# Patient Record
Sex: MALE | Race: WHITE | NOT HISPANIC OR LATINO | URBAN - METROPOLITAN AREA
[De-identification: names, ages, dates, MRNs, and addresses within clinical notes are randomized per-mention and may not be internally consistent; named-entity substitution may affect disease eponyms.]

---

## 2019-10-31 ENCOUNTER — OFFICE VISIT - RIVER FALLS (OUTPATIENT)
Dept: FAMILY MEDICINE | Facility: CLINIC | Age: 24
End: 2019-10-31

## 2019-10-31 ASSESSMENT — MIFFLIN-ST. JEOR: SCORE: 1968.29

## 2020-02-21 ENCOUNTER — OFFICE VISIT - RIVER FALLS (OUTPATIENT)
Dept: FAMILY MEDICINE | Facility: CLINIC | Age: 25
End: 2020-02-21

## 2020-02-21 ASSESSMENT — MIFFLIN-ST. JEOR: SCORE: 1968.29

## 2020-02-23 LAB
CHLAMYDIA TRACHOMATIS RNA, TMA - QUEST: NOT DETECTED
HIV AG/AB  - NOTE: NORMAL
NEISSERIA GONORRHOEAE RNA TMA: NOT DETECTED
RPR: NORMAL

## 2020-02-25 ENCOUNTER — COMMUNICATION - RIVER FALLS (OUTPATIENT)
Dept: FAMILY MEDICINE | Facility: CLINIC | Age: 25
End: 2020-02-25

## 2020-09-18 ENCOUNTER — COMMUNICATION - RIVER FALLS (OUTPATIENT)
Dept: FAMILY MEDICINE | Facility: CLINIC | Age: 25
End: 2020-09-18

## 2020-09-18 ENCOUNTER — OFFICE VISIT - RIVER FALLS (OUTPATIENT)
Dept: FAMILY MEDICINE | Facility: CLINIC | Age: 25
End: 2020-09-18

## 2020-09-18 ENCOUNTER — AMBULATORY - RIVER FALLS (OUTPATIENT)
Dept: FAMILY MEDICINE | Facility: CLINIC | Age: 25
End: 2020-09-18

## 2020-09-21 LAB — SARS-COV-2 RNA SPEC QL NAA+PROBE: NOT DETECTED

## 2020-10-15 ENCOUNTER — OFFICE VISIT - RIVER FALLS (OUTPATIENT)
Dept: FAMILY MEDICINE | Facility: CLINIC | Age: 25
End: 2020-10-15

## 2020-10-20 ENCOUNTER — OFFICE VISIT - RIVER FALLS (OUTPATIENT)
Dept: FAMILY MEDICINE | Facility: CLINIC | Age: 25
End: 2020-10-20

## 2020-10-20 ASSESSMENT — MIFFLIN-ST. JEOR: SCORE: 1990.86

## 2022-02-11 VITALS
SYSTOLIC BLOOD PRESSURE: 116 MMHG | TEMPERATURE: 98 F | WEIGHT: 201.4 LBS | BODY MASS INDEX: 25.85 KG/M2 | OXYGEN SATURATION: 98 % | DIASTOLIC BLOOD PRESSURE: 60 MMHG | HEIGHT: 74 IN | HEART RATE: 66 BPM

## 2022-02-11 VITALS
SYSTOLIC BLOOD PRESSURE: 135 MMHG | TEMPERATURE: 97.1 F | WEIGHT: 201.4 LBS | HEART RATE: 62 BPM | BODY MASS INDEX: 25.85 KG/M2 | DIASTOLIC BLOOD PRESSURE: 77 MMHG | HEIGHT: 74 IN

## 2022-02-11 VITALS
HEART RATE: 65 BPM | WEIGHT: 209 LBS | DIASTOLIC BLOOD PRESSURE: 68 MMHG | HEIGHT: 73 IN | TEMPERATURE: 97.6 F | SYSTOLIC BLOOD PRESSURE: 116 MMHG | BODY MASS INDEX: 27.7 KG/M2 | OXYGEN SATURATION: 98 %

## 2022-02-16 NOTE — PROGRESS NOTES
Patient:   NATHANIEL DANIELSON            MRN: [id number]            FIN: [id number]               Age:   25 years     Sex:  Male     :  1995   Associated Diagnoses:   Pre-employment examination   Author:   Karma Torres      Visit Information      Date of Service: 10/20/2020 11:40 am  Performing Location: [facility]  Encounter#: [id number]      Primary Care Provider (PCP):  Isra Frederick PA-C    NPI# [id number]      Referring Provider:  Kamra Torres    NPI# [id number]      Chief Complaint   10/20/2020 11:57 AM CDT  pre-employment [employer]        History of Present Illness   needss pe and back screen and 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   Family History:    No family history items have been selected or recorded.   Procedure history:    None (SNOMED CT 613682883).   Social History:        Alcohol Assessment            Current, 1-2 times per week                     Comments:                      10/31/2019 - Ryan Acevedo CMA                     2-3 drinks per time      Tobacco Assessment            5-9 cigarettes (between 1/4 to 1/2 pack)/day in last 30 days, Electronic Cigarettes, 1 year(s).                     Comments:                      10/31/2019 Ryan Muniz CMA                     2-5 per day      Substance Abuse Assessment            Current, Marijuana, 1-2 times per month      Employment and Education Assessment            Student, Work/School description: Student at [employer].      Home and Environment Assessment             Marital status: Single.  0 children.  Living situation: Home/Independent.  Injuries/Abuse/Neglect in               household: No.  Feels unsafe at home: No.  Family/Friends available for support: Yes.      Nutrition and Health Assessment            Type of diet: Regular.  Wants to lose weight: No.  Sleeping concerns: Yes.  Feels highly stressed: Yes.      Exercise and Physical Activity Assessment            Exercise frequency: 3-4 times/week.  Exercise type: Running, Weight lifting.      Sexual Assessment            Sexually active: Yes.  Identifies as male, Sexual orientation: Straight or heterosexual.  Uses condoms: Yes.               History of sexual abuse: No.        Physical Examination   VS/Measurements   General:  Alert and oriented, No acute distress.    Eye:  Pupils are equal, round and reactive to light, Normal conjunctiva.    HENT:  Tympanic membranes are clear, Normal hearing.    Neck:  Supple, Non-tender, No lymphadenopathy.    Respiratory:  Lungs are clear to auscultation, Respirations are non-labored, Breath sounds are equal.    Cardiovascular:  Normal rate, Regular rhythm, No murmur.    Musculoskeletal:  Normal range of motion, Normal strength, No swelling, No deformity, Normal gait, back screen complete  .       Impression and Plan   Diagnosis     Pre-employment examination (CBI90-JM Z02.1).     Patient Instructions:       Counseled: Patient, Regarding diagnosis, Regarding treatment, Regarding medications, Verbalized understanding.

## 2022-02-16 NOTE — NURSING NOTE
Depression Screening Entered On:  10/31/2019 1:52 PM CDT    Performed On:  10/31/2019 1:52 PM CDT by Ryan Acevedo CMA               Depression Screening   Little Interest - Pleasure in Activities :   More than half the days   Feeling Down, Depressed, Hopeless :   More than half the days   Initial Depression Screen Score :   4    Trouble Falling or Staying Asleep :   Nearly every day   Feeling Tired or Little Energy :   More than half the days   Poor Appetite or Overeating :   More than half the days   Feeling Bad About Yourself :   Nearly every day   Trouble Concentrating :   Nearly every day   Moving or Speaking Slowly :   More than half the days   Thoughts Better Off Dead or Hurting Self :   Several days   Detailed Depression Screen Score :   16    Total Depression Screen Score :   20    JULIA Difficulty with Work, Home, Others :   Somewhat difficult   Ryan Acevedo CMA - 10/31/2019 1:52 PM CDT

## 2022-02-16 NOTE — NURSING NOTE
Comprehensive Intake Entered On:  10/20/2020 12:00 PM CDT    Performed On:  10/20/2020 11:57 AM CDT by Diya Prado LPN               Summary   Chief Complaint :   pre-employment [employer] [facility] [employer]   Advance Directive :   No   Weight Measured :   209 lb(Converted to: 209 lb 0 oz, 94.801 kg)    Height Measured :   73.25 in(Converted to: 6 ft 1 in, 186.05 cm)    Body Mass Index :   27.38 kg/m2 (HI)    Body Surface Area :   2.21 m2   Systolic Blood Pressure :   116 mmHg   Diastolic Blood Pressure :   68 mmHg   Mean Arterial Pressure :   84 mmHg   Peripheral Pulse Rate :   65 bpm   BP Site :   Right arm   BP Method :   Manual   Temperature Tympanic :   97.6 DegF(Converted to: 36.4 DegC)  (LOW)    Oxygen Saturation :   98 %   Diya Prado LPN - 10/20/2020 11:57 AM CDT   Health Status   Allergies Verified? :   Yes   Medication History Verified? :   Yes   Medical History Verified? :   No   Pre-Visit Planning Status :   Completed   Tobacco Use? :   Former smoker   Diya Prado LPN - 10/20/2020 11:57 AM CDT   Meds / Allergies   (As Of: 10/20/2020 12:00:34 PM CDT)   Allergies (Active)   No Known Medication Allergies  Estimated Onset Date:   Unspecified ; Created By:   Ladi Dias CMA; Reaction Status:   Active ; Category:   Drug ; Substance:   No Known Medication Allergies ; Type:   Allergy ; Updated By:   Ladi Dias CMA; Reviewed Date:   9/18/2020 12:26 PM CDT        Medication List   (As Of: 10/20/2020 12:00:34 PM CDT)        ID Risk Screen   Recent Travel History :   No recent travel   Family Member Travel History :   No recent travel   Other Exposure to Infectious Disease :   Unknown   Diya Prado LPN - 10/20/2020 11:57 AM CDT

## 2022-02-16 NOTE — NURSING NOTE
Comprehensive Intake Entered On:  9/18/2020 12:28 PM CDT    Performed On:  9/18/2020 12:25 PM CDT by Sanam Soni               Summary   Chief Complaint :   Consent for video visit. Pt c/o sore throat and runny/stuffy nose.    Advance Directive :   No   Sanam Soni - 9/18/2020 12:25 PM CDT   Health Status   Allergies Verified? :   Yes   Medication History Verified? :   Yes   Medical History Verified? :   Yes   Pre-Visit Planning Status :   Completed   Tobacco Use? :   Never smoker   Sanam Soni - 9/18/2020 12:25 PM CDT   Consents   Consent for Immunization Exchange :   Consent Granted   Consent for Immunizations to Providers :   Consent Granted   Sanam Soni - 9/18/2020 12:25 PM CDT   Meds / Allergies   (As Of: 9/18/2020 12:28:03 PM CDT)   Allergies (Active)   No Known Medication Allergies  Estimated Onset Date:   Unspecified ; Created By:   Ladi Dias CMA; Reaction Status:   Active ; Category:   Drug ; Substance:   No Known Medication Allergies ; Type:   Allergy ; Updated By:   Ladi Dias CMA; Reviewed Date:   9/18/2020 12:26 PM CDT        Medication List   (As Of: 9/18/2020 12:28:03 PM CDT)   No Known Home Medications     Sanam Soni - 9/18/2020 12:26:22 PM           ID Risk Screen   Recent Travel History :   No recent travel   Family Member Travel History :   No recent travel   Other Exposure to Infectious Disease :   Unknown   Sanam Soni - 9/18/2020 12:25 PM CDT

## 2022-02-16 NOTE — TELEPHONE ENCOUNTER
Patient:   NATHANIEL DANIELSON            MRN: [id number]            FIN: [id number]               Age:   25 years     Sex:  Male     :  1995   Associated Diagnoses:   None   Author:   Luz MUSE, Libby JORDAN      Pt notified of negative RST

## 2022-02-16 NOTE — NURSING NOTE
CAGE Assessment Entered On:  10/31/2019 1:52 PM CDT    Performed On:  10/31/2019 1:52 PM CDT by Ryan Acevedo CMA               Assessment   Have you ever felt you should cut down on your drinking :   Yes   Have people annoyed you by criticizing your drinking :   No   Have you ever felt bad or guilty about your drinking :   No   Have you ever taken a drink first thing in the morning to steady your nerves or get rid of a hangover (Eye-opener) :   No   CAGE Score :   1    Ryan Acevedo CMA - 10/31/2019 1:52 PM CDT

## 2022-02-16 NOTE — TELEPHONE ENCOUNTER
---------------------  From: oRsa M Paulino CMA   Sent: 9/18/2020 3:33:02 PM CDT  Subject: Curbside Testing     Pt was seen for covid curbside testing today per Libby Escobar. 02 Sat 97%. Specimen sent through [employer]. No priority. Faxed forms to [facility].

## 2022-02-16 NOTE — PROGRESS NOTES
Patient:   NATHANIEL DANIELSON            MRN: [id number]            FIN: [id number]               Age:   25 years     Sex:  Male     :  1995   Associated Diagnoses:   Screen for STD (sexually transmitted disease)   Author:   Miki Henry MD      Visit Information      Date of Service: 2020 08:14 am  Performing Location: [facility]  Encounter#: [id number]      Primary Care Provider (PCP):  NONE ,       Referring Provider:  Miki Henry MD    NPI# [id number]      Chief Complaint   2020 8:17 AM CST    STI screening.        History of Present Illness   chief complaint and symptoms as noted above confirmed with patient   no sxs  1 partner last year  no condoms      Review of Systems   Constitutional:  Negative except as documented in history of present illness.    Genitourinary:  Negative.    Integumentary:  Negative.    Neurologic:  Negative.       Health Status   Allergies:    Allergic Reactions (Selected)  No Known Medication Allergies   Medications:    Medications          No medications documented     Problem list:    All Problems  Tobacco user / SNOMED CT 769856828 / Probable      Histories   Past Medical History:    No active or resolved past medical history items have been selected or recorded.   Family History:    No family history items have been selected or recorded.   Procedure history:    None (SNOMED CT 060070988).   Social History:        Alcohol Assessment            Current, 1-2 times per week                     Comments:                      10/31/2019 - Ryan Acevedo CMA                     2-3 drinks per time      Tobacco Assessment            5-9 cigarettes (between 1/4 to 1/2 pack)/day in last 30 days, Electronic Cigarettes, 1 year(s).                     Comments:                      10/31/2019 Ryan Muniz CMA                     2-5 per day      Substance Abuse Assessment            Current, Marijuana, 1-2 times per month      Employment and  Education Assessment            Student, Work/School description: Student at [employer].      Home and Environment Assessment            Marital status: Single.  0 children.  Living situation: Home/Independent.  Injuries/Abuse/Neglect in               household: No.  Feels unsafe at home: No.  Family/Friends available for support: Yes.      Nutrition and Health Assessment            Type of diet: Regular.  Wants to lose weight: No.  Sleeping concerns: Yes.  Feels highly stressed: Yes.      Exercise and Physical Activity Assessment            Exercise frequency: 3-4 times/week.  Exercise type: Running, Weight lifting.      Sexual Assessment            Sexually active: Yes.  Identifies as male, Sexual orientation: Straight or heterosexual.  Uses condoms: Yes.               History of sexual abuse: No.        Physical Examination   Vital Signs   2/21/2020 8:17 AM CST Temperature Tympanic 97.1 DegF  LOW    Peripheral Pulse Rate 62 bpm    HR Method Electronic    Systolic Blood Pressure 135 mmHg  HI    Diastolic Blood Pressure 77 mmHg    Mean Arterial Pressure 96 mmHg    BP Method Electronic      Measurements from flowsheet : Measurements   2/21/2020 8:17 AM CST Height Measured - Standard 74 in    Weight Measured - Standard 201.4 lb    BSA 2.18 m2    Body Mass Index 25.86 kg/m2  HI      General:  Alert and oriented.    Genitourinary:  No scrotal tenderness, No inguinal tenderness, No urethral discharge, No lesions.       Impression and Plan   Diagnosis     Screen for STD (sexually transmitted disease) (USV46-UI Z11.3).     Course:  Progressing as expected.    Plan:  sti screen.    Patient Instructions:       Counseled: Patient, Regarding diagnosis, Regarding treatment, Activity.

## 2022-02-16 NOTE — PROGRESS NOTES
Patient:   NATHANIEL DANIELSON            MRN: [id number]            FIN: [id number]               Age:   24 years     Sex:  Male     :  1995   Associated Diagnoses:   Acute maxillary sinusitis; Conjunctivitis   Author:   Isra Frederick PA-C      Chief Complaint   10/31/2019 11:13 AM CDT  c/o left eye being crusted shut this AM, itchy and feels swollen, sore throat, cough, and nasal congestion for the past 2 weeks        History of Present Illness   Chief complaint and symptoms noted above and confirmed with patient   mattering left eye for 2 days, vision is not affected, does not wear contacts  also has a 2 week hx of sore throat, cough, congestion; taking dayquil for this      Review of Systems   Constitutional:  Negative.    Eye:       Discharge: Left.    Ear/Nose/Mouth/Throat:  Nasal congestion, Sore throat.    Respiratory:  Cough, Sputum production.    Cardiovascular:  Negative.    Gastrointestinal:  Negative.       Health Status   Allergies:    Allergic Reactions (All)  No Known Medication Allergies   Medications: not on any regular medications      Histories   Family History:    No family history items have been selected or recorded.   Procedure history:    None ([id number]).      Physical Examination   Vital Signs   10/31/2019 11:13 AM CDT Temperature Tympanic 98 DegF    Peripheral Pulse Rate 66 bpm    Pulse Site Radial artery    HR Method Manual    Systolic Blood Pressure 116 mmHg    Diastolic Blood Pressure 60 mmHg    Mean Arterial Pressure 79 mmHg    BP Site Right arm    BP Method Manual    Oxygen Saturation 98 %      Measurements from flowsheet : Measurements   10/31/2019 11:13 AM CDT Height Measured - Standard 74 in    Weight Measured - Standard 201.4 lb    BSA 2.18 m2    Body Mass Index 25.86 kg/m2  HI      General:  No acute distress.    Eye:  Pupils are equal, round and reactive to light, Extraocular movements are intact, left sclera and conjunctiva are moderately injected, no mattering.  Right eye  is normal, without injection.    HENT:  Tympanic membranes are clear, maxillary sinus tenderness, oropharynx is moderately enlarged and inflamed without exudate, nares are patent.    Neck:  Supple, Non-tender, No lymphadenopathy.    Respiratory:  lungs have coarse ronchi bilaterally, no wheezes, no rales.    Cardiovascular:  Normal rate, Regular rhythm, No murmur.       Impression and Plan   Diagnosis     Acute maxillary sinusitis (URW01-ZM J01.00).     Conjunctivitis (SWD62-NM H10.9).     Summary:  will treat with tobramycin eye drops and amoxicillin, continue with expectorants/decongestants,  good hand washing, follow up if not improving, will give influenza and adacel vaccines today.    Orders     Orders   Pharmacy:  amoxicillin 875 mg oral tablet (Prescribe): = 1 tab(s) ( 875 mg ), Oral, bid, x 7 day(s), # 14 tab(s), 0 Refill(s), Type: Acute, Pharmacy: [facility] #[id number], 1 tab(s) Oral bid,x7 day(s)  tobramycin 0.3% ophthalmic solution (Prescribe): 1 drop(s), Eye-Left, qid, x 7 day(s), # 5 mL, 0 Refill(s), Type: Maintenance, Pharmacy: [facility] #[id number], 1 drop(s) Eye-Left qid,x7 day(s).     Orders   Charges (Evaluation and Management):  42190 office outpatient new 30 minutes (Charge) (Order): Quantity: 1, Conjunctivitis  Acute maxillary sinusitis.

## 2022-02-16 NOTE — PROGRESS NOTES
Chief Complaint    Consent for video visit. Pt c/o sore throat and runny/stuffy nose.  History of Present Illness          Today's visit was conducted via video due to the COVID-19 pandemic. PT consent to video visit was obtained and documented       Call Start Time:  1232      Call End Time:    1240      Chief complaint as above reviewed and confirmed with patient.  Pt presents to the clinic with concerns re: sore throat, congestion, rhinorrhea x 2 days.  started 9-16-20.  Denies known fevers.  Works at a restaurant as a , primarily outdoors. Tolerating po fluids and solids well.  NO rash.  NO known exposure to strep or mono.  NO known close exposures to Covid 19.   Review of Systems          Review of systems is negative with the exception of those noted in HPI   Physical Exam          nad appears well      able to talk i full sentences, able to swallow saliva.   Assessment/Plan       1. Sore throat (J02.9)         covid and strep testing. NO work until negative Covid 19 results, improving sx and no fever for 24h.   Push fluids, rest and ibuprofen or tylenol for comfort.  return for persistent or worsening sx.          Ordered:          SARS-CoV-2 RNA (COVID-19), Qualitative NAAT* ([facility]), Specimen Type: Nasopharyngeal Swab, Collection Date: 09/18/20 12:36:00 CDT          Strep Grp A Culture (Rapid Strep) (Request), Priority: Urgent, Sore throat  Close exposure to 2019 novel coronavirus           Patient Information     Name:NATHANIEL DANIELSON      Address:      [address] [place] [id number]     Sex:Male     YOB: 1995     [phone]     Emergency Contact:KEENAN DANIELSON     MRN:[id number]     FIN:[id number]     Location:[facility]     Date of Service:09/18/2020      Primary Care Physician:       Otoniel MUSE, Isra BENSON, [phone]      Attending Physician:       Libby Escobar PA-C, [phone]  Problem List/Past Medical History    Ongoing      Tobacco user    Historical     No qualifying data  Procedure/Surgical History     None  Medications   No active medications  Allergies    No Known Medication Allergies  Social History    Smoking Status     Never smoker     Alcohol      Current, 1-2 times per week     Employment/School      Student, Work/School description: Student at [employer].     Exercise      Exercise frequency: 3-4 times/week. Exercise type: Running, Weight lifting.     Home/Environment      Marital status: Single. 0 children. Living situation: Home/Independent. Injuries/Abuse/Neglect in household: No. Feels unsafe at home: No. Family/Friends available for support: Yes.     Nutrition/Health      Type of diet: Regular. Wants to lose weight: No. Sleeping concerns: Yes. Feels highly stressed: Yes.     Sexual      Sexually active: Yes. Identifies as male, Sexual orientation: Straight or heterosexual. Uses condoms: Yes. History of sexual abuse: No.     Substance Abuse      Current, Marijuana, 1-2 times per month     Tobacco      5-9 cigarettes (between 1/4 to 1/2 pack)/day in last 30 days, Electronic Cigarettes, 1 year(s).  Immunizations      Vaccine Date Status          tetanus/diphth/pertuss (Tdap) adult/adol 10/31/2019 Given          influenza virus vaccine, inactivated 10/31/2019 Given          influenza virus vaccine, inactivated 01/05/2018 Recorded          influenza virus vaccine, inactivated 10/11/2016 Recorded          meningococcal conjugate vaccine 08/18/2014 Recorded          Hep A, pediatric/adolescent 08/18/2014 Recorded          influenza virus vaccine, inactivated 10/10/2012 Recorded          influenza virus vaccine, inactivated 10/11/2011 Recorded          influenza virus vaccine, inactivated 10/14/2010 Recorded          influenza, H1N1, inactivated 01/14/2010 Recorded          meningococcal conjugate vaccine 03/24/2009 Recorded          tetanus/diphth/pertuss (Tdap) adult/adol 03/24/2009 Recorded          varicella 07/10/2007 Recorded           Hep A, pediatric/adolescent 08/18/2004 Recorded          DTaP 01/22/2002 Recorded          MMR (measles/mumps/rubella) 01/22/2002 Recorded          IPV 01/22/2002 Recorded          hepatitis B pediatric vaccine 02/18/1999 Recorded          hepatitis B pediatric vaccine 08/15/1998 Recorded          DTaP 04/15/1998 Recorded          Hib (HbOC) 04/15/1998 Recorded              Comments : [11/11/2019] Hib-TITER          varicella 01/09/1998 Recorded          MMR (measles/mumps/rubella) 01/09/1998 Recorded          DTaP 1995 Recorded          IPV 1995 Recorded          Hib (HbOC) 1995 Recorded              Comments : [11/11/2019] Hib-TITER          DTaP 1997 Recorded          hepatitis B pediatric vaccine 1997 Recorded          IPV 1995 Recorded          Hib (HbOC) 1997 Recorded              Comments : [11/11/2019] Hib-TITER          DTaP 1995 Recorded          IPV 1995 Recorded          Hib (HbOC) 1995 Recorded              Comments : [11/11/2019] Hib-TITER

## 2022-02-16 NOTE — NURSING NOTE
Comprehensive Intake Entered On:  2/21/2020 8:19 AM CST    Performed On:  2/21/2020 8:17 AM CST by Marielos Gallegos               Summary   Chief Complaint :   STI screening.    Advance Directive :   No   Weight Measured :   201.4 lb(Converted to: 201 lb 6 oz, 91.35 kg)    Height Measured :   74 in(Converted to: 6 ft 2 in, 187.96 cm)    Body Mass Index :   25.86 kg/m2 (HI)    Body Surface Area :   2.18 m2   Systolic Blood Pressure :   135 mmHg (HI)    Diastolic Blood Pressure :   77 mmHg   Mean Arterial Pressure :   96 mmHg   Peripheral Pulse Rate :   62 bpm   BP Method :   Electronic   HR Method :   Electronic   Temperature Tympanic :   97.1 DegF(Converted to: 36.2 DegC)  (LOW)    Marielos Gallegos - 2/21/2020 8:17 AM CST   Health Status   Allergies Verified? :   Yes   Medication History Verified? :   Yes   Pre-Visit Planning Status :   Completed   Tobacco Use? :   Never smoker   Marielos Gallegos - 2/21/2020 8:17 AM CST   Meds / Allergies   (As Of: 2/21/2020 8:19:19 AM CST)   Allergies (Active)   No Known Medication Allergies  Estimated Onset Date:   Unspecified ; Created By:   Ladi Dias CMA; Reaction Status:   Active ; Category:   Drug ; Substance:   No Known Medication Allergies ; Type:   Allergy ; Updated By:   Ladi Dias CMA; Reviewed Date:   10/31/2019 11:23 AM CDT        Medication List   (As Of: 2/21/2020 8:19:19 AM CST)   Prescription/Discharge Order    tobramycin ophthalmic  :   tobramycin ophthalmic ; Status:   Processing ; Ordered As Mnemonic:   tobramycin 0.3% ophthalmic solution ; Ordering Provider:   Isra Frederick PA-C; Action Display:   Complete ; Catalog Code:   tobramycin ophthalmic ; Order Dt/Tm:   2/21/2020 8:18:01 AM CST

## 2022-02-16 NOTE — NURSING NOTE
Comprehensive Intake Entered On:  10/31/2019 11:19 AM CDT    Performed On:  10/31/2019 11:13 AM CDT by Ladi Dias CMA               Summary   Chief Complaint :   c/o left eye being crusted shut this AM, itchy and feels swollen, sore throat, cough, and nasal congestion for the past 2 weeks   Weight Measured :   201.4 lb(Converted to: 201 lb 6 oz, 91.35 kg)    Height Measured :   74 in(Converted to: 6 ft 2 in, 187.96 cm)    Body Mass Index :   25.86 kg/m2 (HI)    Body Surface Area :   2.18 m2   Systolic Blood Pressure :   116 mmHg   Diastolic Blood Pressure :   60 mmHg   Mean Arterial Pressure :   79 mmHg   Peripheral Pulse Rate :   66 bpm   BP Site :   Right arm   Pulse Site :   Radial artery   BP Method :   Manual   HR Method :   Manual   Temperature Tympanic :   98 DegF(Converted to: 36.7 DegC)    Oxygen Saturation :   98 %   Ladi Dias CMA - 10/31/2019 11:13 AM CDT   Health Status   Allergies Verified? :   Yes   Medication History Verified? :   Yes   Medical History Verified? :   No   Pre-Visit Planning Status :   Completed   Tobacco Use? :   Current every day smoker   Ladi Dias CMA - 10/31/2019 11:13 AM CDT   Consents   Consent for Immunization Exchange :   Consent Granted   Consent for Immunizations to Providers :   Consent Granted   Ladi Dias CMA - 10/31/2019 11:13 AM CDT   Meds / Allergies   (As Of: 10/31/2019 11:19:31 AM CDT)   Allergies (Active)   No Known Medication Allergies  Estimated Onset Date:   Unspecified ; Created By:   Ladi Dias CMA; Reaction Status:   Active ; Category:   Drug ; Substance:   No Known Medication Allergies ; Type:   Allergy ; Updated By:   Ladi Dias CMA; Reviewed Date:   10/31/2019 11:17 AM CDT        Medication List   (As Of: 10/31/2019 11:19:31 AM CDT)

## 2022-02-16 NOTE — LETTER
(Inserted Image. Unable to display)   February 25, 2020      NATHANIEL DANIELSON      [address] [id number]        Dear NATHANIEL,    Thank you for selecting [facility] for your healthcare needs.  Below you will find the results of the recent tests done at our clinic.     All labs acceptable.      Result Name Current Result Reference Range   HIV Ag/Ab  NON-REACTIVE 2/21/2020 NONREACTIVE - NONREACTIVE   RPR Ql  NON-REACTIVE 2/21/2020 NONREACTIVE - NONREACTIVE   Chlamydia RNA  NOT DETECTED 2/21/2020 NOT DETECTED -    Neisseria gonorrhoeae RNA  NOT DETECTED 2/21/2020 NOT DETECTED -        Please contact me or my assistant at [phone] if you have any questions.     Sincerely,        Miki Henry MD        What do your labs mean?  Below is a glossary of commonly ordered labs:  LDL   Bad Cholesterol   HDL   Good Cholesterol  AST/ALT   Liver Function   Cr/Creatinine   Kidney Function  Microalbumin   Kidney Function  BUN   Kidney Function  PSA   Prostate    TSH   Thyroid Hormone  HgbA1c   Diabetes Test   Hgb (Hemoglobin)   Red Blood Cells

## 2022-02-16 NOTE — TELEPHONE ENCOUNTER
Patient Information     Name:NATHANIEL DANIELSON      Address:      [address] [place] [id number]     Sex:Male     YOB: 1995     [phone]     Emergency Contact:KEENAN DANIELSON     MRN:[id number]     FIN:[id number]     Location:[facility]     Date of Service:09/18/2020      Primary Care Physician:       Isra Frederick PA-C, [phone]      Attending Physician:       Libby Escobar PA-C, [phone]   Pt notified of negative covid 19 test.

## 2022-02-16 NOTE — TELEPHONE ENCOUNTER
"---------------------  From: Rayna Connors RN   Sent: 2/27/2020 1:03:15 PM CST  Subject: Results     Pt LVM asking for a call at [phone] to know about lab results from last week.    Called pt at [phone] and LVM that all is \"normal\".  "